# Patient Record
Sex: MALE | Race: BLACK OR AFRICAN AMERICAN | Employment: UNEMPLOYED | URBAN - METROPOLITAN AREA
[De-identification: names, ages, dates, MRNs, and addresses within clinical notes are randomized per-mention and may not be internally consistent; named-entity substitution may affect disease eponyms.]

---

## 2020-04-03 ENCOUNTER — HOSPITAL ENCOUNTER (EMERGENCY)
Age: 35
Discharge: HOME | End: 2020-04-03
Attending: EMERGENCY MEDICINE
Payer: MEDICARE

## 2020-04-03 VITALS
TEMPERATURE: 98.4 F | WEIGHT: 175 LBS | HEIGHT: 67 IN | BODY MASS INDEX: 27.47 KG/M2 | SYSTOLIC BLOOD PRESSURE: 120 MMHG | OXYGEN SATURATION: 98 % | RESPIRATION RATE: 17 BRPM | DIASTOLIC BLOOD PRESSURE: 79 MMHG | HEART RATE: 84 BPM

## 2020-04-03 LAB
BILIRUBIN URINE: NEGATIVE
BLOOD, URINE: ABNORMAL
CLARITY: CLEAR
COLOR: ABNORMAL
EPITHELIAL CELLS, UA: ABNORMAL /HPF (ref 0–5)
GLUCOSE URINE: NEGATIVE MG/DL
KETONES, URINE: NEGATIVE MG/DL
LEUKOCYTE ESTERASE, URINE: NEGATIVE
MICROSCOPIC EXAMINATION: YES
NITRITE, URINE: NEGATIVE
PH UA: 6 (ref 5–8)
PROTEIN UA: NEGATIVE MG/DL
RBC UA: >100 /HPF (ref 0–4)
SPECIFIC GRAVITY UA: 1.01 (ref 1–1.03)
URINE TYPE: ABNORMAL
UROBILINOGEN, URINE: 0.2 E.U./DL
WBC UA: ABNORMAL /HPF (ref 0–5)

## 2020-04-03 PROCEDURE — 99283 EMERGENCY DEPT VISIT LOW MDM: CPT

## 2020-04-03 PROCEDURE — 81001 URINALYSIS AUTO W/SCOPE: CPT

## 2020-04-03 ASSESSMENT — ENCOUNTER SYMPTOMS
RESPIRATORY NEGATIVE: 1
ABDOMINAL PAIN: 0
CONSTIPATION: 0
DIARRHEA: 0
VOMITING: 0
NAUSEA: 0
EYES NEGATIVE: 1
GASTROINTESTINAL NEGATIVE: 1

## 2020-04-03 NOTE — ED PROVIDER NOTES
are visualized and preliminarily interpreted by the ED Provider with the below findings:        PROCEDURES   Unless otherwise noted below, none     Procedures    CRITICAL CARE TIME   N/A    CONSULTS:  None    EMERGENCY DEPARTMENT COURSE and DIFFERENTIAL DIAGNOSIS/MDM:   Vitals:    Vitals:    04/03/20 1200   BP: 120/79   Pulse: 84   Resp: 17   Temp: 98.4 °F (36.9 °C)   TempSrc: Oral   SpO2: 98%   Weight: 175 lb (79.4 kg)   Height: 5' 7\" (1.702 m)       Patient was given the following medications:  Medications - No data to display    70-year-old male with gross hematuria noted last night, improving until this morning. Urine does have a dark yellow color to it however there is no gross blood seen. Vital signs within normal limits. No associated abdominal flank or back pain. Urinalysis shows large blood, and many RBCs, no protein and no evidence of infection otherwise. Feel UTI is unlikely in this situation, and with no other pain unlikely to represent nephro or ureterolithiasis. With no protein, unlikely glomerulonephritis or nephrotic syndrome. Many other potential causes for hematuria so will refer to urology for further evaluation. He has been referred to them in the past but has not followed up. Will reinforce the importance of this. At this time however with mild symptoms, feel he is appropriate for discharge and outpatient management. FINAL IMPRESSION      1. Hematuria, unspecified type          DISPOSITION/PLAN   DISPOSITION Decision To Discharge 04/03/2020 12:28:30 PM      PATIENT REFERRED TO:  Jayla Brooke DO  [address] [facility] [id number]  [phone]    Schedule an appointment as soon as possible for a visit   urology for hematuria (blood in urine)      DISCHARGE MEDICATIONS:  New Prescriptions    No medications on file       DISCONTINUED MEDICATIONS:  Discontinued Medications    CIPROFLOXACIN (CIPRO) 500 MG TABLET    Take 500 mg by mouth 2 times daily.